# Patient Record
Sex: MALE | Race: WHITE | NOT HISPANIC OR LATINO | Employment: UNEMPLOYED | URBAN - METROPOLITAN AREA
[De-identification: names, ages, dates, MRNs, and addresses within clinical notes are randomized per-mention and may not be internally consistent; named-entity substitution may affect disease eponyms.]

---

## 2019-05-23 ENCOUNTER — NON-PROVIDER VISIT (OUTPATIENT)
Dept: OCCUPATIONAL MEDICINE | Facility: CLINIC | Age: 29
End: 2019-05-23

## 2019-05-23 DIAGNOSIS — Z02.1 PRE-EMPLOYMENT DRUG SCREENING: ICD-10-CM

## 2019-05-23 LAB
AMP AMPHETAMINE: NORMAL
COC COCAINE: NORMAL
INT CON NEG: NORMAL
INT CON POS: NORMAL
MET METHAMPHETAMINES: NORMAL
OPI OPIATES: NORMAL
PCP PHENCYCLIDINE: NORMAL
POC DRUG COMMENT 753798-OCCUPATIONAL HEALTH: NEGATIVE
THC: NORMAL

## 2019-05-23 PROCEDURE — 80305 DRUG TEST PRSMV DIR OPT OBS: CPT | Performed by: PREVENTIVE MEDICINE

## 2019-09-15 ENCOUNTER — OFFICE VISIT (OUTPATIENT)
Dept: URGENT CARE | Facility: MEDICAL CENTER | Age: 29
End: 2019-09-15
Payer: COMMERCIAL

## 2019-09-15 VITALS
OXYGEN SATURATION: 96 % | TEMPERATURE: 98.4 F | HEIGHT: 70 IN | HEART RATE: 70 BPM | SYSTOLIC BLOOD PRESSURE: 118 MMHG | RESPIRATION RATE: 16 BRPM | BODY MASS INDEX: 31.21 KG/M2 | WEIGHT: 218 LBS | DIASTOLIC BLOOD PRESSURE: 82 MMHG

## 2019-09-15 DIAGNOSIS — S86.911A KNEE STRAIN, RIGHT, INITIAL ENCOUNTER: ICD-10-CM

## 2019-09-15 PROCEDURE — 99203 OFFICE O/P NEW LOW 30 MIN: CPT | Performed by: PHYSICIAN ASSISTANT

## 2019-09-15 NOTE — PATIENT INSTRUCTIONS
Stretching and range of motion exercises  These exercises warm up your muscles and joints and improve the movement and flexibility of your leg. These exercises also help to relieve pain and stiffness.  Exercise A: Knee flexion, passive  1. Start this exercise in one of these positions:  ¨ Lying on the floor in front of an open doorway, with your left / right foot lightly touching the wall and your other leg extending through the doorway.  ¨ Lying on the floor with both feet on the wall.  ¨ Lying on a bed with both feet on the wall or headboard.  2. Without using any effort, allow gravity to let your foot slide down the wall slowly until you feel a gentle stretch in the front of your left / right knee, or until your knee reaches the angle that your health care provider tells you.  3. Hold this stretch for __________ seconds.  4. Return your leg to the starting position, using your healthy leg to do the work or to help if needed.  Repeat __________ times. Complete this exercise __________ times a day.  Exercise B: Knee extension, passive  1. Sit with your left / right leg or heel propped on another chair, a coffee table, or a footstool. Do not have anything under your knee to support it.  2. Allow your leg muscles to relax, letting gravity straighten out your knee. You should feel a stretch behind your left / right knee.  3. If told by your health care provider, deepen the stretch by placing a __________ lb weight on your thigh, just above your kneecap.  4. Hold this position for __________ seconds.  Repeat __________ times. Complete this exercise __________ times a day.  Strengthening exercises  These exercises build strength and endurance in your leg. Endurance is the ability to use your muscles for a long time, even after they get tired.  Exercise C: Quadriceps, isometric  1. Lie on your back with your left / right leg extended and your other knee bent. If told by your health care provider, you can put a rolled  towel or small pillow under your knee.  2. Gradually tense the muscles in the front of your left / right thigh. You should see your kneecap slide up toward your hip or see increased dimpling just above the knee. This motion will push the back of the knee down toward the floor.  3. For __________ seconds, hold the muscle as tight as you can without increasing your pain.  4. Relax the muscles slowly and completely.  Repeat __________ times. Complete this exercise __________ times a day.  Exercise D: Straight leg raises (hip flexors and quadriceps)  1. Lie on your back with your left / right leg extended and your other knee bent.  2. Tense the muscles in the front of your left / right thigh. You should see your kneecap slide up toward your hip or see increased dimpling just above the knee.  3. Keep these muscles tight while you raise your leg 4-6 inches (10-15 cm) off the floor.  4. Hold this position for __________ seconds.  5. Keep the muscles tense while you lower your leg.  6. Relax the muscles slowly and completely before you start the next repetition.  Repeat __________ times. Complete this exercise __________ times a day.  Exercise E: Straight leg raises (hip abductors)  1. Lie on your side, with your left / right leg in the top position.  ¨ Lie so your head, shoulder, knee, and hip line up with each other.  ¨ You may bend your lower knee to help you balance.  2. Lift your top leg 4-6 inches (10-15 cm) while keeping your toes pointed straight ahead.  3. Hold this position for __________ seconds.  4. Slowly lower your leg to the starting position. Allow your muscles to relax completely after each repetition.  Repeat __________ times. Complete this exercise __________ times a day.  This information is not intended to replace advice given to you by your health care provider. Make sure you discuss any questions you have with your health care provider.  Document Released: 12/18/2006 Document Revised: 08/24/2017  Document Reviewed: 11/29/2016  [employer] Interactive Patient Education © 2017 [employer]

## 2019-09-15 NOTE — PROGRESS NOTES
"  Subjective:   Timothy Montero is a 29 y.o. male who presents today with   Chief Complaint   Patient presents with   • Knee Pain     (R) strained it, previous injury reaggravated it, x 1 week        HPI  Patient presents for a new problem has been occurring over the past week.  He states that he has had right knee pain that began when he was helping his sister move in a box fell on the proximal portion of his right patella.  Patient has been wearing a knee brace ever since.  He states that he exhibits pain with twisting and when trying to lift.  He has a long history of knee issues and states that he has hyperflexible in his patella has been affected several times before.  He has been seen by an orthopedic specialist and had MRIs done in the past.  Patient is requesting me to fill out his paperwork to restrict his duties at work and also is requesting exercises to help strengthen his knee.  Patient has been taking Aleve which significantly helps with his symptoms.  PMH:  has a past medical history of Psychiatric disorder.  MEDS:   Current Outpatient Medications:   •  ondansetron (ZOFRAN) 4 MG TABS, Take 1 Tab by mouth every 8 hours as needed for Nausea/Vomiting., Disp: 10 Each, Rfl: 1  ALLERGIES: No Known Allergies  SURGHX: History reviewed. No pertinent surgical history.  SOCHX:  reports that he has quit smoking. His smoking use included cigarettes. He smoked 0.50 packs per day. He has never used smokeless tobacco. He reports that he drinks alcohol.  FH: Reviewed with patient, not pertinent to this visit.       Review of Systems   Musculoskeletal:        Right knee pain        Objective:   /82   Pulse 70   Temp 36.9 °C (98.4 °F)   Resp 16   Ht 1.778 m (5' 10\")   Wt 98.9 kg (218 lb)   SpO2 96%   BMI 31.28 kg/m²   Physical Exam   Constitutional: Vital signs are normal. He appears well-developed and well-nourished. No distress.   HENT:   Head: Normocephalic and atraumatic.   Right Ear: Hearing normal.   "   Left Ear: Hearing normal.   Eyes: Pupils are equal, round, and reactive to light.   Cardiovascular: Normal rate, regular rhythm and normal heart sounds.   Pulmonary/Chest: Effort normal.   Musculoskeletal:        Right knee: He exhibits normal range of motion, no swelling and no effusion. Tenderness found. Patellar tendon tenderness noted.        Legs:  Slight tenderness palpation of the patella.  Patient is able to squat and bend his leg with some generalized soreness but no pain.  Patient has normal strength in his lower extremities bilaterally against resistance with flexion and extension.  When patient was asked to plant and pivot with his right knee he experienced pain.    Neurological: He is alert. Coordination normal.   Skin: Skin is warm and dry.   Psychiatric: He has a normal mood and affect.   Nursing note and vitals reviewed.        Assessment/Plan:   Assessment    1. Knee strain, right, initial encounter  Discussed strengthening exercises with patient and that he should wear his knee brace at all times while at work.  Patient should follow these restrictions for 1 week and return to clinic if symptoms are still persistent.  No imaging necessary today.  Differential diagnosis, natural history, supportive care, and indications for immediate follow-up discussed.   Patient given instructions and understanding of medications and treatment.    If not improving in 3-5 days return to .    Patient agreeable to plan.    Please note that this dictation was created using voice recognition software. I have made every reasonable attempt to correct obvious errors, but I expect that there are errors of grammar and possibly content that I did not discover before finalizing the note.    Amarjit Pereyra PA-C

## 2019-09-15 NOTE — LETTER
September 15, 2019         Patient: Timothy Montero   YOB: 1990   Date of Visit: 9/15/2019           To Whom it May Concern:    Timothy Montero was seen in my clinic on 9/15/2019. He should avoid any lifting over 25 pounds.  Patient should avoid any activity requiring pivoting or twisting of his right knee for 1 week.    If you have any questions or concerns, please don't hesitate to call.        Sincerely,           Amarjit Pereyra P.A.-C.  Electronically Signed

## 2019-10-01 ENCOUNTER — OFFICE VISIT (OUTPATIENT)
Dept: URGENT CARE | Facility: MEDICAL CENTER | Age: 29
End: 2019-10-01
Payer: COMMERCIAL

## 2019-10-01 VITALS
OXYGEN SATURATION: 95 % | RESPIRATION RATE: 16 BRPM | HEART RATE: 69 BPM | DIASTOLIC BLOOD PRESSURE: 80 MMHG | SYSTOLIC BLOOD PRESSURE: 120 MMHG | HEIGHT: 70 IN | WEIGHT: 218 LBS | TEMPERATURE: 98.9 F | BODY MASS INDEX: 31.21 KG/M2

## 2019-10-01 DIAGNOSIS — G89.29 CHRONIC PAIN OF RIGHT KNEE: Primary | ICD-10-CM

## 2019-10-01 DIAGNOSIS — M25.561 CHRONIC PAIN OF RIGHT KNEE: Primary | ICD-10-CM

## 2019-10-01 PROCEDURE — 99214 OFFICE O/P EST MOD 30 MIN: CPT | Performed by: PHYSICIAN ASSISTANT

## 2019-10-02 NOTE — PATIENT INSTRUCTIONS
Knee Pain  Knee pain is a very common symptom and can have many causes. Knee pain often goes away when you follow your health care provider's instructions for relieving pain and discomfort at home. However, knee pain can develop into a condition that needs treatment. Some conditions may include:  · Arthritis caused by wear and tear (osteoarthritis).  · Arthritis caused by swelling and irritation (rheumatoid arthritis or gout).  · A cyst or growth in your knee.  · An infection in your knee joint.  · An injury that will not heal.  · Damage, swelling, or irritation of the tissues that support your knee (torn ligaments or tendinitis).  If your knee pain continues, additional tests may be ordered to diagnose your condition. Tests may include X-rays or other imaging studies of your knee. You may also need to have fluid removed from your knee. Treatment for ongoing knee pain depends on the cause, but treatment may include:  · Medicines to relieve pain or swelling.  · Steroid injections in your knee.  · Physical therapy.  · Surgery.  HOME CARE INSTRUCTIONS  · Take medicines only as directed by your health care provider.  · Rest your knee and keep it raised (elevated) while you are resting.  · Do not do things that cause or worsen pain.  · Avoid high-impact activities or exercises, such as running, jumping rope, or doing jumping jacks.  · Apply ice to the knee area:  ¨ Put ice in a plastic bag.  ¨ Place a towel between your skin and the bag.  ¨ Leave the ice on for 20 minutes, 2-3 times a day.  · Ask your health care provider if you should wear an elastic knee support.  · Keep a pillow under your knee when you sleep.  · Lose weight if you are overweight. Extra weight can put pressure on your knee.  · Do not use any tobacco products, including cigarettes, chewing tobacco, or electronic cigarettes. If you need help quitting, ask your health care provider. Smoking may slow the healing of any bone and joint problems that you may  have.  SEEK MEDICAL CARE IF:  · Your knee pain continues, changes, or gets worse.  · You have a fever along with knee pain.  · Your knee jenni or locks up.  · Your knee becomes more swollen.  SEEK IMMEDIATE MEDICAL CARE IF:   · Your knee joint feels hot to the touch.  · You have chest pain or trouble breathing.  This information is not intended to replace advice given to you by your health care provider. Make sure you discuss any questions you have with your health care provider.  Document Released: 10/14/2008 Document Revised: 01/08/2016 Document Reviewed: 08/03/2015  [employer] Interactive Patient Education © 2017 [employer]

## 2019-10-02 NOTE — PROGRESS NOTES
Subjective:      Pt is a 29 y.o. male who presents with Knee Injury ((R) went back to work but knee pain came back right away, )            HPI  This is a recurrent issue. Patient presents for a recurrent problem has been occurring over the past 3 weeks.  He states that he has had right knee pain that began when he was helping his sister move in a box fell on the proximal portion of his right patella.  Patient has been wearing a knee brace ever since.  He states that he exhibits pain with twisting and when trying to lift.  He has a long history of knee issues and states that he has hyperflexible in his patella has been affected several times before.  He has been seen by an orthopedic specialist and had MRIs done in the past.  Patient is requesting me to fill out his paperwork to restrict his duties at work and also is requesting exercises to help strengthen his knee.  Patient has been taking Aleve which significantly helps with his symptoms. Pt has not taken any Rx medications for this condition. Pt states the pain is a 7-8/10, aching in nature and worse at night. Pt asking for narcotic pain medicine and a work note for 2 weeks off work. Pt denies CP, SOB, NVD, paresthesias, headaches, dizziness, change in vision, hives, or other joint pain. The pt's medication list, problem list, and allergies have been evaluated and reviewed during today's visit.    PMH:  Past Medical History:   Diagnosis Date   • Psychiatric disorder     bipolar       PSH:  Negative per pt.      Fam Hx:  the patient's family history is not pertinent to their current complaint      Soc HX:  Social History     Socioeconomic History   • Marital status: Single     Spouse name: Not on file   • Number of children: Not on file   • Years of education: Not on file   • Highest education level: Not on file   Occupational History   • Not on file   Social Needs   • Financial resource strain: Not on file   • Food insecurity:     Worry: Not on file      Inability: Not on file   • Transportation needs:     Medical: Not on file     Non-medical: Not on file   Tobacco Use   • Smoking status: Former Smoker     Packs/day: 0.50     Types: Cigarettes   • Smokeless tobacco: Never Used   Substance and Sexual Activity   • Alcohol use: Yes   • Drug use: Not on file   • Sexual activity: Not on file   Lifestyle   • Physical activity:     Days per week: Not on file     Minutes per session: Not on file   • Stress: Not on file   Relationships   • Social connections:     Talks on phone: Not on file     Gets together: Not on file     Attends Christianity service: Not on file     Active member of club or organization: Not on file     Attends meetings of clubs or organizations: Not on file     Relationship status: Not on file   • Intimate partner violence:     Fear of current or ex partner: Not on file     Emotionally abused: Not on file     Physically abused: Not on file     Forced sexual activity: Not on file   Other Topics Concern   • Not on file   Social History Narrative   • Not on file         Medications:    Current Outpatient Medications:   •  ondansetron (ZOFRAN) 4 MG TABS, Take 1 Tab by mouth every 8 hours as needed for Nausea/Vomiting., Disp: 10 Each, Rfl: 1      Allergies:  Patient has no known allergies.    ROS  Constitutional: Negative for fever, chills and malaise/fatigue.   HENT: Negative for congestion and sore throat.    Eyes: Negative for blurred vision, double vision and photophobia.   Respiratory: Negative for cough and shortness of breath.  Cardiovascular: Negative for chest pain and palpitations.   Gastrointestinal: Negative for heartburn, nausea, vomiting, abdominal pain, diarrhea and constipation.   Genitourinary: Negative for dysuria and flank pain.   Musculoskeletal: POS for right knee joint pain and myalgias.   Skin: Negative for itching and rash.   Neurological: Negative for dizziness, tingling and headaches.   Endo/Heme/Allergies: Does not bruise/bleed  "easily.   Psychiatric/Behavioral: Negative for depression. The patient is not nervous/anxious.           Objective:     /80   Pulse 69   Temp 37.2 °C (98.9 °F)   Resp 16   Ht 1.778 m (5' 10\")   Wt 98.9 kg (218 lb)   SpO2 95%   BMI 31.28 kg/m²      Physical Exam   Musculoskeletal:        Right knee: He exhibits decreased range of motion. He exhibits no swelling, no effusion, no ecchymosis, no deformity, no laceration, no erythema, normal alignment, no LCL laxity, normal patellar mobility, no bony tenderness, normal meniscus and no MCL laxity. Tenderness found. Medial joint line, lateral joint line and patellar tendon tenderness noted. No MCL and no LCL tenderness noted.        Legs:        Constitutional: PT is oriented to person, place, and time. PT appears well-developed and well-nourished. No distress.   HENT:   Head: Normocephalic and atraumatic.   Mouth/Throat: Oropharynx is clear and moist. No oropharyngeal exudate.   Eyes: Conjunctivae normal and EOM are normal. Pupils are equal, round, and reactive to light.   Neck: Normal range of motion. Neck supple. No thyromegaly present.   Cardiovascular: Normal rate, regular rhythm, normal heart sounds and intact distal pulses.  Exam reveals no gallop and no friction rub.    No murmur heard.  Pulmonary/Chest: Effort normal and breath sounds normal. No respiratory distress. PT has no wheezes. PT has no rales. Pt exhibits no tenderness.   Abdominal: Soft. Bowel sounds are normal. PT exhibits no distension and no mass. There is no tenderness. There is no rebound and no guarding.   Neurological: PT is alert and oriented to person, place, and time. PT has normal reflexes. No cranial nerve deficit.   Skin: Skin is warm and dry. No rash noted. PT is not diaphoretic. No erythema.       Psychiatric: PT has a normal mood and affect. PT behavior is normal. Judgment and thought content normal.          Assessment/Plan:     1. Chronic pain of right knee    - REFERRAL TO " SPORTS MEDICINE    Filled out work paperwork and noted this needed to be done in the future with PCP or specialist  I do not feel narcotic pain medication is appropriate currently  Could not create a work note for 14 days off and pt accepting of this.  RICE therapy discussed  Gentle ROM exercises discussed  WBAT RLE  Ice/heat therapy discussed  OTC ibuprofen for pain control  Rest, fluids encouraged.  AVS with medical info given.  Pt was in full understanding and agreement with the plan.  Follow-up as needed if symptoms worsen or fail to improve.

## 2019-10-07 ENCOUNTER — OFFICE VISIT (OUTPATIENT)
Dept: MEDICAL GROUP | Facility: CLINIC | Age: 29
End: 2019-10-07
Payer: COMMERCIAL

## 2019-10-07 ENCOUNTER — APPOINTMENT (OUTPATIENT)
Dept: RADIOLOGY | Facility: IMAGING CENTER | Age: 29
End: 2019-10-07
Attending: FAMILY MEDICINE
Payer: COMMERCIAL

## 2019-10-07 VITALS
SYSTOLIC BLOOD PRESSURE: 122 MMHG | BODY MASS INDEX: 31.21 KG/M2 | DIASTOLIC BLOOD PRESSURE: 80 MMHG | TEMPERATURE: 98.7 F | HEART RATE: 85 BPM | OXYGEN SATURATION: 97 % | HEIGHT: 70 IN | WEIGHT: 218 LBS | RESPIRATION RATE: 18 BRPM

## 2019-10-07 DIAGNOSIS — M25.561 PATELLOFEMORAL JOINT PAIN, RIGHT: ICD-10-CM

## 2019-10-07 PROCEDURE — 73564 X-RAY EXAM KNEE 4 OR MORE: CPT | Mod: TC,RT | Performed by: FAMILY MEDICINE

## 2019-10-07 PROCEDURE — 99203 OFFICE O/P NEW LOW 30 MIN: CPT | Performed by: FAMILY MEDICINE

## 2019-10-07 NOTE — PROGRESS NOTES
"CHIEF COMPLAINT:  Silvestre Montero III male presenting at the request of Rafa Castillo PA-C for evaluation of knee pain.     Silvestre Montero III is complaining of right knee pain  9/9/19, helped sister move and box pressure down on his knee cap  Pain is at the anterior, posterior knee  Quality is burning  Pain is non-radiating, but having posterior knee pain as well  Improved with resting  Aggravated by walking, driving  previous knee injury, patellar dislocation x 2 (last episode at age 24)   Prior Treatments: seen at   Prior studies: NO Prior imaging has been done   Medications tried for pain include: ibuprofen (OTC) which is not really helping  Mechanical Symptom history: No Locking, Popping and clicking which can be painful     Warehouse job,  (standing), 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 No pertinent past medical history.    PMH:  has a past medical history of Psychiatric disorder.  MEDS:   Current Outpatient Medications:   •  ondansetron (ZOFRAN) 4 MG TABS, Take 1 Tab by mouth every 8 hours as needed for Nausea/Vomiting. (Patient not taking: Reported on 10/7/2019), Disp: 10 Each, Rfl: 1  ALLERGIES: No Known Allergies  SURGHX: No past surgical history on file.  SOCHX:  reports that he has quit smoking. His smoking use included cigarettes. He smoked 0.50 packs per day. He has never used smokeless tobacco. He reports that he drinks alcohol.  FH: Family history was reviewed, no pertinent findings to report     PHYSICAL EXAM:  /80 (BP Location: Left arm, Patient Position: Sitting, BP Cuff Size: Large adult)   Pulse 85   Temp 37.1 °C (98.7 °F) (Temporal)   Resp 18   Ht 1.778 m (5' 10\")   Wt 98.9 kg (218 lb)   SpO2 97%   BMI 31.28 kg/m²      slightly overweight in no apparent distress, alert and " oriented x 3.  Gait: antalgic     RIGHT Knee:  Slight Varus and No Swelling  Range of Motion Slightly limited with Flexion   Trace effusion  Patellar Apprehension, Inferior pole tenderness and Superior pole tenderness  Medial Joint Line Non-tender and NEGATIVE Jean  Lateral Joint Line Non-tender and NEGATIVE Jean  Trace Laxity with Varus stress  Trace Laxity with Valgus stress  Lachman's testing is Trace  The leg is otherwise neurovascularly intact    LEFT Knee:  Slight Varus and No Swelling   Range of Motion Intact  Trace effusion  Patellar No tenderness and no apprehension  Medial Joint Line Non-tender and NEGATIVE Jean  Lateral Joint Line Non-tender and NEGATIVE Jean  Trace Laxity with Varus stress  Trace Laxity with Valgus stress  Lachman's testing is Trace  The leg is otherwise neurovascularly intact    Additional Findings: Hypermobility of BOTH patellas    1. 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  To see if he started formal physical therapy at that point        10/7/2019 4:38 PM    HISTORY/REASON FOR EXAM:  Atraumatic right knee pain.      TECHNIQUE/EXAM DESCRIPTION AND NUMBER OF VIEWS:  4 views of the RIGHT knee including bilateral sunrise view.    COMPARISON: None    FINDINGS:    The alignment of the knee is within normal limits.  There is no definite  joint  effusion.  There is no evidence of displaced fracture or dislocation.  There is no focal swelling.  Both patellae are normally located. There is curvilinear ossification adjacent to the medial pole of the right patella which may represent sequela of old injury or segmentation variant of the patella.        Impression       1.  No right knee fracture or dislocation.    2.  Curvilinear ossification adjacent to medial pole the right patella which may represent sequela of old injury or segmentation variant.        Thank you Rafa Castillo PA-C for allowing me to participate in caring for the patient.

## 2019-10-07 NOTE — LETTER
October 7, 2019         Patient: Silvestre Montero III   YOB: 1990   Date of Visit: 10/7/2019           To Whom it May Concern:    Silvestre Montero was seen in my clinic on 10/7/2019.  Due to injury he has been advised to avoid squatting, lifting greater than 25 pounds and limiting the use of stairs.  He should also use his knee brace at all times when he is active until reevaluation in 3 weeks.    If you have any questions or concerns, please don't hesitate to call.        Sincerely,           Dimas Vale M.D.  Electronically Signed

## 2019-10-17 NOTE — OP THERAPY EVALUATION
"  Outpatient Physical Therapy  INITIAL EVALUATION    [facility] [address].  [address]  [place] [address]  Phone:  [phone]  Fax:  [phone]    Date of Evaluation: 10/18/2019    Patient: TETO Montero III  YOB: 1990  MRN: [id number]     Referring Provider: No referring provider defined for this encounter.   Referring Diagnosis No admission diagnoses are documented for this encounter.     Time Calculation  Start time: 830  Stop time: 945 Time Calculation (min): 75 minutes       Physical Therapy Occurrence Codes    Date of onset of impairment:  19   Date physical therapy care plan established or reviewed:  10/18/19   Date physical therapy treatment started:  10/18/19          Chief Complaint: Knee Problem    Visit Diagnoses     ICD-10-CM   1. Right knee pain, unspecified chronicity M25.561         Subjective   History of Present Illness:     Date of onset:  2019    History of chief complaint:  Patient is a 29 year old male with a PMH including: Bipolar disorder; no other hx per record. Pt reports he was 9 years old when first incidence of knee buckling occurred. Hx of 3x patellar dislocations (2014 most recent). Reports no hx of surgery, only therapy to address. Pt reports hx of significant hyper flexibility and \"above average strength.\"     Pt presents to therapy with complaints of R knee pain with intermittent numbness/tingling in R shin after helping sister move. Pt reports carrying 40# box which fell onto R knee, and pushing down superior patella with extended knee. Hx of popping, cracking, and locking in R knee.    Pt consents to evaluation and treatment today.       Prior level of function:   at a [employer]    Pain:     Current pain ratin    At best pain ratin (Very seldom)    At worst pain ratin    Location:  Lateral knee, posterior knee, inferior and superior patellar tendons    Quality:  Aching, tightness and " "discomfort (\"Numbing pain\")    Pain timing:  Intermittent    Aggravating factors:  Driving 3-3.5 hours  Prolonged sitting <60 min (Jain is difficult; sitting through whole sermon) causing stiffness  Descending steps >ascending steps with brace  Prolonged stand >20 min (difficulty prepping meals)            Relieving factors:  Rest  Knee Brace  Ice  Hot baths (helps loosen knee up)  Massaging entire RLE    Progression:  Stable    Pain comments:        Activity Tolerance:     Current activity tolerance / Recreational activities:  \"I'm at home most of the time.\" \"I go to the [employer] or game shop occasionally.\" \"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 Pt reports he manually lifts up to 80 pounds and 50 pounds regularly.  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 health appt: 10/28/19    Social Support:     Lives in:  Apartment (Bottom floor with 2 steps to enter with handrail)    Diagnostic Tests:     X-ray: abnormal      Treatments:     Treatments to date:  Pervious tx:   Pt given hep from recent appointment with PAC; \"I haven't really done the exercises on the sheet because of pain.\"  Tracking brace    PT 2006, PT 2014; \"PT was helpful; I was able to get back to working in a warehouse, it was very helpful.\" I'm still doing the hip abduction and long arc quads.    Imaging: R knee x ray 10/7/19:   FINDINGS:    The alignment of the knee is within normal limits.  There is no definite  joint effusion.  There is no evidence of displaced fracture or dislocation.  There is no focal swelling.  1.  No right knee fracture or dislocation.    2. Curvilinear ossification adjacent to medial pole the right patella which may represent sequela of old injury " "or segmentation variant.      Patient Goals:     Patient goals for therapy:  More stability and control  RTW      Past Medical History:   Diagnosis Date   • Psychiatric disorder     bipolar     History reviewed. No pertinent surgical history.    Precautions:       Objective   Observation and functional movement:  Seated posture: fair    Standing posture: fair  Genu valgum bilateral knees with noted B hyperextension       Range of motion and strength:    AROM: Excessive BLE mobility noted    Knee extension:  R: 10 deg hyperextension  L: 10 deg hyperextension    HS length: Excessive    Ankle Inv/Ev:  R: Pain and guarding R knee \"it feels weird in my knee to turn my ankle\"     Strength:  Hip flexion:  R: 4+/5  L: 5/5  Hip abduction: tested in hooklying  R: 4-  L: 4-  Knee extension: tested in sitting  R: 4-/5  L: 5/5  Knee flexion: testing in prone  R: 4-/5 all HS (pain; \"I felt that in the entire thing\" referring to R knee)  L:4-/5 all HS  Ankle PF: tested in sitting  R: 4+/5  L:5/5  Ankle DF: tested in sitting  R: 4+/5 (\"it feels weird in front of the knee\")  L: 5/5     Supine Bridge: Fatigue after 5 reps; full AROM; muscle juddering noted in R thigh        Sensation and reflexes:     Sensation: B LE dermatomes intact. Reports tingling/paresthesia in R lateral shin but sensation in area intact     Reflexes:   R patellar: 0  R achilles: 2+  L patellar: 0  L achilles: 2+      Palpation and joint mobility:     Patellar mobility:   L: WNL sup and inferior; excessive medial and lateral glide  R: WNL sup and inferior; excessive medial; guarding and apprehension with lateral glide    TTP  Joint line tenderness medial and lateral R knee  Tenderness inferior and superior pole R patellar tendon  Tenderness R patellar retinacular region          Special tests:      Valgus stress test: -  Varus stress test: -  Tammy's: -; \"that feels weird in the front of the knee\"        Additional objective details:      Excessive " "hypermobility noted in all joints        Therapeutic Exercises (CPT 03630):     2. Supine bridges, 1x10, fatigue, muscle juddering R thigh; hep    3. Supine SLR, 1x5, hep; \"I can feel it in the front of the R knee\"    4. Supine ankle PF/DF, 1x10 ea, \"this doesn't hurt like when I was sitting;\" hep    5. Sidelying hip abduction, 1x2, trunk rotation, increase in R knee s/s    6. Sidelying clamshells, 1x5ea, fatigue; hep    8. UPOC: 11/29/19      Therapeutic Exercise Summary: Pt education: Pt educated regarding appropriate range for knee extension in open chain to avoid close packed positioning of patella. Provided with written hep handout. Educated to continue wearing knee brace at this time.    Therapeutic Treatments and Modalities:     1. E Stim Unattended (CPT 44936), Russian stim to R VMO 5'5 with knee extension 90 deg>45 deg in sitting x 10 min, followed by heat to R knee x 5 min in sitting, Emphasis on knee extension strength in tolerable range avoiding closed packed positions of patella in open chain positioning    Therapeutic Treatment and Modalities Summary: Pt provided with handout for TENS unit for home use    Time-based treatments/modalities:  Therapeutic exercise minutes (CPT 76793): 15 minutes       Assessment, Response and Plan:   Impairments: activity intolerance, difficulty performing job, impaired physical strength, lacks appropriate home exercise program, limited ADL's and weight-bearing intolerance    Other Impairments:  Hypermobility with limited stability  Assessment details:  Patient is a 29 year old male with a PMH including: Bipolar disorder; no other hx per record. Pt reports he was 9 years old when first incidence of knee buckling occurred. Hx of 3x patellar dislocations (2014 most recent). Reports no hx of surgery, only therapy to address. Pt reports hx of significant hyper flexibility and \"above average strength.\" Pt presents to therapy with complaints of R knee pain with intermittent " numbness/tingling in R shin after helping sister move. Pt reports carrying 40# box which fell onto R knee, and pushing down superior patella with extended knee. Hx of popping, cracking, and locking in R knee. Pt would benefit from skilled physical therapy in order to address above impairments including B genu valgum with hyperextension, excessive mobility B patellae, hypermobility with limited stability B knees, posterior chain weakness in order to improve QOL and return to reported ADL's.     Barriers to therapy:  None  Prognosis details:  Good-fair  Goals:   Short Term Goals:   1. Pt will be independent with written HEP.    Short term goal time span:  2-4 weeks      Long Term Goals:    1. Pt will be independent with written HEP.  2. Pt will have a WOMAC score improvement of 30% (eval:46.88)  3. Pt will be able to stand without modification in order to prepare meals and perform other ADL's.  4. Pt will be able to lift 50# with modification without increase in symptoms 75% of the time or greater in order to RTW.  5. Pt will be able to descend 4 in step without handrail without increase in symptoms in order to leave house without difficulty.  6. Pt will be able to sit for 60 min with modification without increase in symptoms 75% of the time or greater in order to sit for entire sermon at [employer].  Long term goal time span:  6-8 weeks    Plan:   Therapy options:  Physical therapy treatment to continue  Planned therapy interventions:  Manual Therapy (CPT 64817), E Stim Unattended (CPT 27253), Neuromuscular Re-education (CPT 10890), Therapeutic Activities (CPT 46281) and Therapeutic Exercise (CPT 49722)  Frequency:  2x week  Duration in weeks:  6  Discussed with:  Patient  Plan details:  UPOC: 11/29/19              Functional Assessment Used  WOMAC Grand Total: 46.88     Cliff Powers, PT, DPT, PWR!Moves    Referring provider co-signature:  I have reviewed this plan of care and my co-signature certifies the need for  services.  Certification Dates:   From 10/18/19     To 11/29/19      Physician Signature: ________________________________ Date: ______________

## 2019-10-18 ENCOUNTER — PHYSICAL THERAPY (OUTPATIENT)
Dept: PHYSICAL THERAPY | Facility: REHABILITATION | Age: 29
End: 2019-10-18
Attending: FAMILY MEDICINE
Payer: COMMERCIAL

## 2019-10-18 DIAGNOSIS — M25.561 RIGHT KNEE PAIN, UNSPECIFIED CHRONICITY: ICD-10-CM

## 2019-10-18 PROCEDURE — 97110 THERAPEUTIC EXERCISES: CPT

## 2019-10-18 PROCEDURE — 97014 ELECTRIC STIMULATION THERAPY: CPT

## 2019-10-18 PROCEDURE — 97161 PT EVAL LOW COMPLEX 20 MIN: CPT

## 2019-10-18 ASSESSMENT — ENCOUNTER SYMPTOMS
PAIN SCALE AT HIGHEST: 7
QUALITY: DISCOMFORT
PAIN SCALE: 2
PAIN TIMING: INTERMITTENT
QUALITY: ACHING
PAIN SCALE AT LOWEST: 0
QUALITY: TIGHTNESS

## 2019-10-21 ENCOUNTER — APPOINTMENT (OUTPATIENT)
Dept: PHYSICAL THERAPY | Facility: REHABILITATION | Age: 29
End: 2019-10-21
Attending: FAMILY MEDICINE
Payer: COMMERCIAL

## 2019-10-21 NOTE — OP THERAPY DAILY TREATMENT
"  Outpatient Physical Therapy  DAILY TREATMENT     [facility] Physical [address].  [address]  [place] [address]  Phone:  [phone]  Fax:  [phone]    Date: 10/21/2019    Patient: TETO Montero III  YOB: 1990  MRN: [id number]     Time Calculation               Chief Complaint: No chief complaint on file.    Visit #: 2    SUBJECTIVE:      OBJECTIVE:  Current objective measures:           Therapeutic Exercises (CPT 36986):     1. Reviewed hep     2. Supine bridges, 1x10, fatigue, muscle juddering R thigh; hep    3. Supine SLR, 1x5, hep; \"I can feel it in the front of the R knee\"    4. Supine ankle PF/DF, 1x10 ea, \"this doesn't hurt like when I was sitting;\" hep    6. Sidelying clamshells, 1x5ea, fatigue; hep    7. HS rolls with swiss ball    8. Bridge on swiss ball    9. Ankle DF/PF and Inv with TB    14. UPOC: 11/29/19      Therapeutic Exercise Summary: Pt education: Pt educated regarding appropriate range for knee extension in open chain to avoid close packed positioning of patella. Provided with written hep handout. Educated to continue wearing knee brace at this time.    Therapeutic Treatments and Modalities:     1. E Stim Unattended (CPT 59329), Russian stim to R VMO 5'5 with knee extension 90 deg>45 deg in sitting x 10 min, followed by heat to R knee x 5 min in sitting, Emphasis on knee extension strength in tolerable range avoiding closed packed positions of patella in open chain positioning    Therapeutic Treatment and Modalities Summary: Pt provided with handout for TENS unit for home use    Time-based treatments/modalities:      **ankle PF/DF and Inv      ASSESSMENT:   Response to treatment:     PLAN/RECOMMENDATIONS:   Plan for treatment: therapy treatment to continue next visit.  Planned interventions for next visit: continue with current treatment.      Cliff Powers, PT, DPT, PWR!Moves         "

## 2019-11-26 ENCOUNTER — TELEPHONE (OUTPATIENT)
Dept: PHYSICAL THERAPY | Facility: REHABILITATION | Age: 29
End: 2019-11-26

## 2019-11-26 NOTE — OP THERAPY DISCHARGE SUMMARY
Outpatient Physical Therapy  DISCHARGE SUMMARY NOTE      [facility] [address].  [address]  [place] [address]  Phone:  [phone]  Fax:  [phone]    Date of Visit: 11/26/2019    Patient: TETO Seguraehn III  YOB: 1990  MRN: [id number]     Referring Provider: No referring provider defined for this encounter.   Referring Diagnosis No admission diagnoses are documented for this encounter.     Physical Therapy Occurrence Codes    Date of onset of impairment:  9/9/19   Date physical therapy care plan established or reviewed:  10/18/19   Date physical therapy treatment started:  10/18/19          Functional Assessment Used        Your patient is being discharged from Physical Therapy with the following comments:   · Patient has failed to schedule follow ups        Recommendations:  Pt has not been seen since initial evaluation >30 days. Pt has failed to schedule additional follow ups. Per policy, pt will need to be seen by PCP or acquire another referral prior to initiating skilled physical therapy. Pt is being discharged at this time.        Cliff Powers, PT    Date: 11/26/2019

## 2020-02-07 ENCOUNTER — TELEPHONE (OUTPATIENT)
Dept: URGENT CARE | Facility: MEDICAL CENTER | Age: 30
End: 2020-02-07

## 2020-02-08 NOTE — TELEPHONE ENCOUNTER
Pt was seen october and got a referral to see a specialist but due to insurance change he needs a need referral to be seen.

## 2020-02-10 ENCOUNTER — TELEPHONE (OUTPATIENT)
Dept: URGENT CARE | Facility: PHYSICIAN GROUP | Age: 30
End: 2020-02-10

## 2020-02-10 DIAGNOSIS — M25.561 CHRONIC PAIN OF RIGHT KNEE: Primary | ICD-10-CM

## 2020-02-10 DIAGNOSIS — G89.29 CHRONIC PAIN OF RIGHT KNEE: Primary | ICD-10-CM

## 2020-02-10 NOTE — TELEPHONE ENCOUNTER
Pt notes he had original referral but insurance changed and needs a new referral. Placed it today.  Rafa Castillo PA-C

## 2020-03-17 ENCOUNTER — TELEPHONE (OUTPATIENT)
Dept: HEALTH INFORMATION MANAGEMENT | Facility: OTHER | Age: 30
End: 2020-03-17

## 2020-03-17 ENCOUNTER — OFFICE VISIT (OUTPATIENT)
Dept: URGENT CARE | Facility: CLINIC | Age: 30
End: 2020-03-17
Payer: OTHER MISCELLANEOUS

## 2020-03-17 VITALS
OXYGEN SATURATION: 97 % | SYSTOLIC BLOOD PRESSURE: 120 MMHG | TEMPERATURE: 97.2 F | WEIGHT: 218 LBS | HEIGHT: 70 IN | BODY MASS INDEX: 31.21 KG/M2 | HEART RATE: 73 BPM | RESPIRATION RATE: 15 BRPM | DIASTOLIC BLOOD PRESSURE: 76 MMHG

## 2020-03-17 DIAGNOSIS — J06.9 VIRAL URI WITH COUGH: ICD-10-CM

## 2020-03-17 LAB
FLUAV+FLUBV AG SPEC QL IA: NEGATIVE
INT CON NEG: NORMAL
INT CON NEG: NORMAL
INT CON POS: NORMAL
INT CON POS: NORMAL
S PYO AG THROAT QL: NEGATIVE

## 2020-03-17 PROCEDURE — 99214 OFFICE O/P EST MOD 30 MIN: CPT | Performed by: FAMILY MEDICINE

## 2020-03-17 PROCEDURE — 87804 INFLUENZA ASSAY W/OPTIC: CPT | Performed by: FAMILY MEDICINE

## 2020-03-17 PROCEDURE — 87880 STREP A ASSAY W/OPTIC: CPT | Performed by: FAMILY MEDICINE

## 2020-03-17 RX ORDER — BENZONATATE 200 MG/1
200 CAPSULE ORAL 3 TIMES DAILY PRN
Qty: 45 CAP | Refills: 0 | Status: SHIPPED | OUTPATIENT
Start: 2020-03-17 | End: 2022-05-13

## 2020-03-17 RX ORDER — FLUTICASONE PROPIONATE 50 MCG
1 SPRAY, SUSPENSION (ML) NASAL 2 TIMES DAILY
Qty: 1 BOTTLE | Refills: 0 | Status: SHIPPED | OUTPATIENT
Start: 2020-03-17 | End: 2022-05-13

## 2020-03-17 NOTE — TELEPHONE ENCOUNTER
1. Caller Name: Silvestre Montero III                          Call Back Number: [phone]    [facility] PCP or Specialty Provider: No  Uninsured, no PCP         2.  Does patient have any active symptoms of respiratory illness (fever OR cough OR shortness of breath)? Yes, the patient reports the following respiratory symptoms: cough and dry cough, nasal congestion, sinus pressure, lethargic, alternating between hot/cold..    3.  Does patient have any comoribidities? None     4.  In the last 30 days, has the patient traveled outside of the country OR in a high risk area within the  OR have any known contact with someone who has or is suspected to have COVID-19?  No.    5. Disposition: Advised to perform self care, monitor for worsening symptoms and to call back in 3 days if no improvement . Pt states he needs a note from a doctor to stay home because it is required from his work. Advised to go to  located at [address] [place] [address], phone number and instructions given.    Note routed to PCP: KASSI only.

## 2020-03-17 NOTE — LETTER
March 17, 2020         Patient: Silvestre Montero III   YOB: 1990   Date of Visit: 3/17/2020           To Whom it May Concern:    Silvestre Montero was seen in my clinic on 3/17/2020. He may return to work on Monday.    If you have any questions or concerns, please don't hesitate to call.        Sincerely,           Luis Dumont M.D.  Electronically Signed

## 2020-03-18 NOTE — PROGRESS NOTES
"Subjective:     CC:  presents with Pharyngitis            Pharyngitis   This is a new problem. The current episode started in the past 4 days. The problem has been unchanged. There has been subj fever. The pain is mild. Associated symptoms includes: nasal congestion, prod cough.  Pertinent negatives include no abdominal pain,   diarrhea, headaches, shortness of breath or vomiting. no exposure to strep or mono.   has tried acetaminophen for the symptoms. The treatment provided mild relief.     Social History     Tobacco Use   • Smoking status: Former Smoker     Packs/day: 0.50     Types: Cigarettes   • Smokeless tobacco: Never Used   Substance Use Topics   • Alcohol use: Yes   • Drug use: Not on file       Past Medical History:   Diagnosis Date   • Psychiatric disorder     bipolar       Review of Systems   Constitutional: Positive for malaise/fatigue. Negative for fever and weight loss.   HENT: Positive for sore throat, ear congestion.    Respiratory: Negative for cough, sputum production and shortness of breath.    Cardiovascular: Negative for chest pain.   Gastrointestinal: Negative for nausea, vomiting, abdominal pain and diarrhea.   Genitourinary: Negative.    Neurological: Negative for dizziness and headaches.   All other systems reviewed and are negative.         Objective:   /76   Pulse 73   Temp 36.2 °C (97.2 °F) (Temporal)   Resp 15   Ht 1.778 m (5' 10\")   Wt 98.9 kg (218 lb)   SpO2 97%         Physical Exam   Constitutional:   oriented to person, place, and time.  appears well-developed and well-nourished. No distress.   HENT:   Head: Normocephalic and atraumatic.   Right Ear: External ear normal.   Left Ear: External ear normal.   TMs both nl  Nose: Mucosal edema present. Right sinus exhibits no maxillary sinus tenderness and no frontal sinus tenderness. Left sinus exhibits no maxillary sinus tenderness and no frontal sinus tenderness.   Mouth/Throat: no posterior oropharyngeal exudate.   There " is posterior oropharyngeal erythema present. No posterior oropharyngeal edema.   Tonsils 1+ bilaterally     Eyes: Conjunctivae and EOM are normal. Pupils are equal, round, and reactive to light. Right eye exhibits no discharge. Left eye exhibits no discharge. No scleral icterus.   Neck: Normal range of motion. Neck supple. No JVD present. No tracheal deviation present. No thyromegaly present.   Cardiovascular: Normal rate, regular rhythm, normal heart sounds and intact distal pulses.  Exam reveals no friction rub.    No murmur heard.  Pulmonary/Chest: Effort normal and breath sounds normal. No respiratory distress.   no wheezes.   no rales.    Musculoskeletal:  exhibits no edema.   Lymphadenopathy:    no cervical LAD  Neurological:   alert and oriented to person, place, and time.   Skin: Skin is warm and dry. No erythema.   Psychiatric:   normal mood and affect.   Nursing note and vitals reviewed.             Assessment/Plan:     1. Viral URI with cough  Rapid strep, influenza negative.      - benzonatate (TESSALON) 200 MG capsule; Take 1 Cap by mouth 3 times a day as needed for Cough.  Dispense: 45 Cap; Refill: 0  - fluticasone (FLONASE) 50 MCG/ACT nasal spray; Spray 1 Spray in nose 2 times a day.  Dispense: 1 Bottle; Refill: 0    Follow up in one week if no improvement, sooner if symptoms worsen.

## 2020-05-05 ENCOUNTER — APPOINTMENT (OUTPATIENT)
Dept: RADIOLOGY | Facility: IMAGING CENTER | Age: 30
End: 2020-05-05
Attending: FAMILY MEDICINE
Payer: OTHER MISCELLANEOUS

## 2020-05-05 ENCOUNTER — OFFICE VISIT (OUTPATIENT)
Dept: URGENT CARE | Facility: CLINIC | Age: 30
End: 2020-05-05
Payer: OTHER MISCELLANEOUS

## 2020-05-05 ENCOUNTER — HOSPITAL ENCOUNTER (OUTPATIENT)
Facility: MEDICAL CENTER | Age: 30
End: 2020-05-05
Attending: FAMILY MEDICINE
Payer: OTHER MISCELLANEOUS

## 2020-05-05 VITALS
WEIGHT: 227 LBS | RESPIRATION RATE: 20 BRPM | BODY MASS INDEX: 33.62 KG/M2 | TEMPERATURE: 98.9 F | DIASTOLIC BLOOD PRESSURE: 80 MMHG | OXYGEN SATURATION: 95 % | SYSTOLIC BLOOD PRESSURE: 130 MMHG | HEART RATE: 86 BPM | HEIGHT: 69 IN

## 2020-05-05 DIAGNOSIS — R06.02 SOB (SHORTNESS OF BREATH): ICD-10-CM

## 2020-05-05 DIAGNOSIS — R53.83 FATIGUE, UNSPECIFIED TYPE: ICD-10-CM

## 2020-05-05 LAB
ALBUMIN SERPL BCP-MCNC: 4.8 G/DL (ref 3.2–4.9)
ALBUMIN/GLOB SERPL: 1.5 G/DL
ALP SERPL-CCNC: 97 U/L (ref 30–99)
ALT SERPL-CCNC: 26 U/L (ref 2–50)
ANION GAP SERPL CALC-SCNC: 14 MMOL/L (ref 7–16)
AST SERPL-CCNC: 25 U/L (ref 12–45)
BASOPHILS # BLD AUTO: 0.8 % (ref 0–1.8)
BASOPHILS # BLD: 0.1 K/UL (ref 0–0.12)
BILIRUB SERPL-MCNC: 0.5 MG/DL (ref 0.1–1.5)
BUN SERPL-MCNC: 15 MG/DL (ref 8–22)
CALCIUM SERPL-MCNC: 10 MG/DL (ref 8.5–10.5)
CHLORIDE SERPL-SCNC: 96 MMOL/L (ref 96–112)
CO2 SERPL-SCNC: 26 MMOL/L (ref 20–33)
CREAT SERPL-MCNC: 0.79 MG/DL (ref 0.5–1.4)
D DIMER PPP IA.FEU-MCNC: 0.27 UG/ML (FEU) (ref 0–0.5)
EOSINOPHIL # BLD AUTO: 0.34 K/UL (ref 0–0.51)
EOSINOPHIL NFR BLD: 2.6 % (ref 0–6.9)
ERYTHROCYTE [DISTWIDTH] IN BLOOD BY AUTOMATED COUNT: 42.5 FL (ref 35.9–50)
FLUAV+FLUBV AG SPEC QL IA: NEGATIVE
GLOBULIN SER CALC-MCNC: 3.2 G/DL (ref 1.9–3.5)
GLUCOSE BLD-MCNC: 105 MG/DL (ref 70–100)
GLUCOSE SERPL-MCNC: 109 MG/DL (ref 65–99)
HCT VFR BLD AUTO: 44.6 % (ref 42–52)
HGB BLD-MCNC: 14.5 G/DL (ref 14–18)
IMM GRANULOCYTES # BLD AUTO: 0.15 K/UL (ref 0–0.11)
IMM GRANULOCYTES NFR BLD AUTO: 1.1 % (ref 0–0.9)
INT CON NEG: NORMAL
INT CON POS: NORMAL
LYMPHOCYTES # BLD AUTO: 3.73 K/UL (ref 1–4.8)
LYMPHOCYTES NFR BLD: 28.5 % (ref 22–41)
MCH RBC QN AUTO: 28.3 PG (ref 27–33)
MCHC RBC AUTO-ENTMCNC: 32.5 G/DL (ref 33.7–35.3)
MCV RBC AUTO: 86.9 FL (ref 81.4–97.8)
MONOCYTES # BLD AUTO: 1.05 K/UL (ref 0–0.85)
MONOCYTES NFR BLD AUTO: 8 % (ref 0–13.4)
NEUTROPHILS # BLD AUTO: 7.71 K/UL (ref 1.82–7.42)
NEUTROPHILS NFR BLD: 59 % (ref 44–72)
NRBC # BLD AUTO: 0 K/UL
NRBC BLD-RTO: 0 /100 WBC
PLATELET # BLD AUTO: 359 K/UL (ref 164–446)
PMV BLD AUTO: 10.2 FL (ref 9–12.9)
POTASSIUM SERPL-SCNC: 3.8 MMOL/L (ref 3.6–5.5)
PROT SERPL-MCNC: 8 G/DL (ref 6–8.2)
RBC # BLD AUTO: 5.13 M/UL (ref 4.7–6.1)
SODIUM SERPL-SCNC: 136 MMOL/L (ref 135–145)
TSH SERPL DL<=0.005 MIU/L-ACNC: 3.36 UIU/ML (ref 0.38–5.33)
WBC # BLD AUTO: 13.1 K/UL (ref 4.8–10.8)

## 2020-05-05 PROCEDURE — 71046 X-RAY EXAM CHEST 2 VIEWS: CPT | Mod: TC | Performed by: FAMILY MEDICINE

## 2020-05-05 PROCEDURE — 87804 INFLUENZA ASSAY W/OPTIC: CPT | Performed by: FAMILY MEDICINE

## 2020-05-05 PROCEDURE — 85379 FIBRIN DEGRADATION QUANT: CPT

## 2020-05-05 PROCEDURE — 99214 OFFICE O/P EST MOD 30 MIN: CPT | Performed by: FAMILY MEDICINE

## 2020-05-05 PROCEDURE — 80053 COMPREHEN METABOLIC PANEL: CPT

## 2020-05-05 PROCEDURE — 85025 COMPLETE CBC W/AUTO DIFF WBC: CPT

## 2020-05-05 PROCEDURE — 84443 ASSAY THYROID STIM HORMONE: CPT

## 2020-05-05 PROCEDURE — 82962 GLUCOSE BLOOD TEST: CPT | Performed by: FAMILY MEDICINE

## 2020-05-05 NOTE — LETTER
May 5, 2020         Patient: Silvestre Montero III   YOB: 1990   Date of Visit: 5/5/2020           To Whom it May Concern:    Silvestre Montero was seen in my clinic on 5/5/2020.     Please excuse his absence through 5/10.    If you have any questions or concerns, please don't hesitate to call.        Sincerely,           Luis Dumont M.D.  Electronically Signed

## 2020-05-06 ENCOUNTER — TELEPHONE (OUTPATIENT)
Dept: URGENT CARE | Facility: CLINIC | Age: 30
End: 2020-05-06

## 2020-05-06 NOTE — PROGRESS NOTES
"Subjective:       Chief Complaint   Patient presents with   • Headache     fatigue,  winded, body aches, lathargic, x2 weeks, TESTED NEGATIVE FOR COVID19               fatigue  This is a new problem.   He c/o constant sob and extreme fatigue and increased sleep requirements - he has been sleeping > 12 hrs/night for the past week.  States that fatigue is worse with exertion.    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   He also has had intermittent, low-grade fevers, Tmax 100.1.           He denies cough or wheezing and does not endorse \"air hunger\", but does c/o occasionally feeling \"winded\".   He has a hx of asthma as a child, mostly exertional asthma, but has not needed to use any rescue inhaler for several years.          COVID-19 testing negative x 1, per pt - had done at outside facility.     + intermittent nausea and headaches, but no emesis or abd pain.         . Pertinent negatives include no abdominal pain, chest pain,    hemoptysis, leg pain, leg swelling, neck pain, orthopnea, PND, sore throat, syncope or vomiting. exertion aggravates the symptoms. The patient has no known risk factors for DVT/PE. Pt has tried nothing for the symptoms. There is no history of CAD, chronic lung disease, COPD, DVT, a heart failure or PE.     Social History     Tobacco Use   • Smoking status: Former Smoker     Packs/day: 0.50     Types: Cigarettes   • Smokeless tobacco: Never Used   Substance Use Topics   • Alcohol use: Not Currently   • Drug use: Not on file       Past Medical History:   Diagnosis Date   • Psychiatric disorder     bipolar       Current Outpatient Medications on File Prior to Visit   Medication Sig Dispense Refill   • benzonatate (TESSALON) 200 MG capsule Take 1 Cap by mouth 3 times a day as needed for Cough. (Patient not taking: Reported on 5/5/2020) 45 Cap 0   • fluticasone (FLONASE) 50 MCG/ACT nasal spray Spray 1 Spray in nose 2 times a day. " "(Patient not taking: Reported on 5/5/2020) 1 Bottle 0     No current facility-administered medications on file prior to visit.          Review of Systems   Constitutional: + for fever, fatigye.   HENT: Negative for sore throat.    Respiratory: . Negative for cough, wheezing and hemoptysis.    Cardiovascular: Negative for chest pain, orthopnea, leg swelling, syncope and PND.   Gastrointestinal: Negative for vomiting and abdominal pain.   Genitourinary: Negative for dysuria and urgency.   Musculoskeletal: Negative for neck pain.   Neurological: Negative for headaches.   All other systems reviewed and are negative.         Objective:     /80   Pulse 86   Temp 37.2 °C (98.9 °F) (Temporal)   Resp 20   Ht 1.753 m (5' 9\")   Wt 103 kg (227 lb)   SpO2 95%     Physical Exam   Constitutional: pt is oriented to person, place, and time.  appears well-developed and well-nourished. No distress.   HENT:   Head: Normocephalic and atraumatic.   Mouth/Throat: No oropharyngeal exudate.   Eyes: Conjunctivae and EOM are normal. Pupils are equal, round, and reactive to light.   Neck: Neck supple. No JVD present.   Cardiovascular: Normal rate, regular rhythm and normal heart sounds.  Exam reveals no friction rub.    No murmur heard.  Pulmonary/Chest: Effort normal and breath sounds normal. No respiratory distress. Pt has no rales.   Musculoskeletal: pt exhibits no edema or tenderness.   Lymphadenopathy:     She has no cervical adenopathy.   Neurological: pt is alert and oriented to person, place, and time. No cranial nerve deficit.   Skin: Skin is warm. She is not diaphoretic. No erythema.   Psychiatric: Her behavior is normal.   Nursing note and vitals reviewed.         DX-CHEST-2 VIEWS   Order: [id number]   Status:  Final result   Visible to patient:  No (Not Released) Next appt:  None Dx:  SOB (shortness of breath)   Details     Reading Physician Reading Date Result Priority   Gio Guzman M.D.  [phone] 5/5/2020 " Urgent Care      Narrative & Impression        5/5/2020 7:03 PM     HISTORY/REASON FOR EXAM:  COUGH; Shortness of Breath        TECHNIQUE/EXAM DESCRIPTION AND NUMBER OF VIEWS:  Two views of the chest.     COMPARISON:  8/11/2012.     FINDINGS:     No pulmonary infiltrates or consolidations are noted.  No pleural effusions, no pneumothorax are appreciated.  Normal cardiopericardial silhouette.     IMPRESSION:        1. No active cardiopulmonary abnormalities are identified.                Assessment/Plan:               Fatigue, unspecified type       Chest x-ray was personally interpreted and reviewed. No acute cardiopulmonary findings. No pulmonary infiltrates or densities. Cardiac silhouette is normal. No hemidiaphragm elevation. No bony abnormalities.    Influenza assay was negative.   Random glucose was normal.        COVID-19 testing negative x 1.     D-dimer was negative, which rules out PE        Will refer to primary care for further evaluation of the fatigue.         - CBC WITH DIFFERENTIAL; Future  - Comp Metabolic Panel; Future  - TSH; Future  - POCT Influenza A/B  - POCT Glucose     - DX-CHEST-2 VIEWS; Future  - D-DIMER; Future  - POCT Influenza A/B  - POCT Glucose

## 2020-05-08 ENCOUNTER — TELEPHONE (OUTPATIENT)
Dept: URGENT CARE | Facility: CLINIC | Age: 30
End: 2020-05-08

## 2020-05-08 ENCOUNTER — APPOINTMENT (OUTPATIENT)
Dept: LAB | Facility: MEDICAL CENTER | Age: 30
End: 2020-05-08
Payer: OTHER MISCELLANEOUS

## 2020-05-08 DIAGNOSIS — R53.83 OTHER FATIGUE: ICD-10-CM

## 2020-05-08 NOTE — PROGRESS NOTES
Spoke with pt and clairified the treatment:      -WBC elevated, but no obvious source of infection.   Will repeat CBC    -asthma is controlled.   No need for pulmonology referral at this time.     - will refer to PCP for further evaluation of the chronic fatigue.

## 2020-05-08 NOTE — TELEPHONE ENCOUNTER
Patient came to [facility] upset since he said you stated he is taking antibiotics when he has not been prescribed anything. He is upset so he would like you to send that medication or would like to clarify this problem please give him a call ASAP. Thank you for your cooperation.

## 2020-05-14 DIAGNOSIS — R53.83 OTHER FATIGUE: ICD-10-CM

## 2020-05-20 ENCOUNTER — TELEPHONE (OUTPATIENT)
Dept: URGENT CARE | Facility: PHYSICIAN GROUP | Age: 30
End: 2020-05-20

## 2021-01-30 ENCOUNTER — APPOINTMENT (OUTPATIENT)
Dept: RADIOLOGY | Facility: MEDICAL CENTER | Age: 31
End: 2021-01-30
Attending: EMERGENCY MEDICINE
Payer: MEDICAID

## 2021-01-30 ENCOUNTER — HOSPITAL ENCOUNTER (EMERGENCY)
Facility: MEDICAL CENTER | Age: 31
End: 2021-01-30
Attending: EMERGENCY MEDICINE
Payer: MEDICAID

## 2021-01-30 VITALS
HEART RATE: 88 BPM | DIASTOLIC BLOOD PRESSURE: 78 MMHG | HEIGHT: 69 IN | TEMPERATURE: 99.4 F | BODY MASS INDEX: 35.36 KG/M2 | SYSTOLIC BLOOD PRESSURE: 114 MMHG | OXYGEN SATURATION: 98 % | WEIGHT: 238.76 LBS | RESPIRATION RATE: 18 BRPM

## 2021-01-30 DIAGNOSIS — S63.502A SPRAIN OF LEFT WRIST, INITIAL ENCOUNTER: ICD-10-CM

## 2021-01-30 DIAGNOSIS — S83.92XA SPRAIN OF LEFT KNEE, UNSPECIFIED LIGAMENT, INITIAL ENCOUNTER: ICD-10-CM
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
## 2021-01-30 PROCEDURE — 73562 X-RAY EXAM OF KNEE 3: CPT | Mod: LT

## 2021-01-30 PROCEDURE — 73110 X-RAY EXAM OF WRIST: CPT | Mod: LT

## 2021-01-30 PROCEDURE — 99283 EMERGENCY DEPT VISIT LOW MDM: CPT | Mod: EDC

## 2021-01-30 ASSESSMENT — FIBROSIS 4 INDEX: FIB4 SCORE: 0.41

## 2021-01-30 NOTE — ED NOTES
"First interaction with patient.  Assumed care at this time.  Patient presents to the ER today for complaint of left wrist and lateral left knee pain s/p ground level fall.  He states that he fell on ice just prior to arrival.  He reports history of \"torn ligaments to my [left medial] knee, so I just want to make sure that I didn't damage it more.\"  No swelling, bruising, or deformity noted to left wrist or knee.  Gown provided, patient instructed to changed prior to ERP evaluation.  NPO status explained by this RN.  Call light provided.  Chart up for ERP.    This RN provided education to patient about the importance of keeping mask in place over both mouth and nose for entire duration of ER visit.  "

## 2021-01-30 NOTE — ED TRIAGE NOTES
..  Chief Complaint   Patient presents with   • Wrist Pain     c/o left knee and left wrist pain after a fall.    • Knee Pain       .  Vitals:    01/30/21 1350   BP: 127/83   Pulse: 90   Resp: 16   Temp: 36.9 °C (98.5 °F)   SpO2: 98%         Explained triage process, to waiting room. Asked to inform RN if questions or concerns arise.

## 2021-01-30 NOTE — ED PROVIDER NOTES
ED Provider  Scribed for Trip Pereira D.O. by Mahesh Reese. 1/30/2021  2:38 PM    Means of arrival: walk-in  History obtained from: patient  History limited by: none    CHIEF COMPLAINT  Chief Complaint   Patient presents with   • Wrist Pain     c/o left knee and left wrist pain after a fall.    • Knee Pain     PPE Note: I personally donned full PPE for all patient encounters during this visit, including wearing a mask, gloves, and eye protection. Scribe remained outside the patient's room and did not have any contact with the patient for the duration of patient encounter.       HPI  Silvestre Montero III is a left-hand dominant 30 y.o. male who presents with complaints of left wrist and left knee pain acute shortly prior to arrival after slipping and falling on ice. He states that he landed on the outside of his left knee and his left wrist with his hand extended backwards. He has mild-moderate pain along the lateral aspect of his left knee. His pain is worsened with extension of his knee. His wrist pain is worsened with extension of his fingers, but he states he is able to move them. He reports having a history of partial ligamentous tears of both of his knees in the past that did not require surgical intervention.    REVIEW OF SYSTEMS  See HPI for further details.     PAST MEDICAL HISTORY   has a past medical history of Asthma and Psychiatric disorder.    SOCIAL HISTORY  Social History     Tobacco Use   • Smoking status: Former Smoker     Packs/day: 0.50     Types: Cigarettes   • Smokeless tobacco: Never Used   Substance and Sexual Activity   • Alcohol use: Not Currently   • Drug use: Not Currently       SURGICAL HISTORY  patient denies any surgical history    CURRENT MEDICATIONS  Home Medications     Reviewed by Fatou Domínguez R.N. (Registered Nurse) on 01/30/21 at 1356  Med List Status: Not Addressed   Medication Last Dose Status   benzonatate (TESSALON) 200 MG capsule  Active   fluticasone (FLONASE) 50  "MCG/ACT nasal spray  Active                ALLERGIES  No Known Allergies    PHYSICAL EXAM  VITAL SIGNS: /83   Pulse 90   Temp 36.9 °C (98.5 °F) (Temporal)   Resp 16   Ht 1.753 m (5' 9\")   Wt 108 kg (238 lb 12.1 oz)   SpO2 98%   BMI 35.26 kg/m²   Constitutional: Alert in no apparent distress.  HENT: Normocephalic, Atraumatic, mucous membranes are moist.   Eyes: Conjunctiva normal, non-icteric.   Heart: No peripheral cyanosis   Lungs: Respirations are even and unlabored   Skin: Warm, Dry, normal color.   Extremities: Left wrist has no deformity, swelling, or discoloration. Range of motion is mildly limited by pain. Distal neurovsacular is normal. Range of motion of fingers is normal. Left knee has no swelling, deformity, or discoloration. Range of motion intact. Knee is stable in all directions. Distal neurovascular intact.   Neurologic: Alert, Grossly non-focal.   Psychiatric: Affect normal, Judgment normal, Mood normal, Appears appropriate and not intoxicated.     DIAGNOSTIC STUDIES / PROCEDURES    RADIOLOGY  DX-WRIST-COMPLETE 3+ LEFT   Final Result      No acute osseous abnormality.      DX-KNEE 3 VIEWS LEFT   Final Result      No fracture or dislocation of LEFT knee.        The radiologist's interpretations of all radiological studies have been reviewed by me.    Films have been independently by me      COURSE  Pertinent Labs & Imaging studies reviewed. (See chart for details)    2:38 PM - Patient seen and examined at bedside. Discussed plan of care, including xray imaging. Patient agrees to the plan of care. Ordered for left knee and left wrist xray to evaluate his symptoms.     3:23 PM - Patient was reevaluated at bedside. Patient is doing well currently. Discussed radiology results with the patient which did not show any acute abnormalities or fractures. I informed them of the plan for discharge. Patient was provided with wrist splint for comfort.  Advised follow-up with a PCP for further " evaluation and treatment. Advised Tylenol/ibuprofen and ice for pain control. ED return precautions were discussed. Patient verbalizes understanding and agreement to this plan of care.      MEDICAL DECISION MAKING  This is a 30 y.o. male who presents after ground-level fall, slipped on the ice.  Complains of pain in the wrist and knee.  The exam of this area shows some mild tenderness, no obvious signs of fracture but x-rays were done x-ray shows no fracture or dislocation.  He is stable for symptomatic treatment    The patient will return for new or worsening symptoms and is stable at the time of discharge. The patient is referred to a primary physician for blood pressure management, diabetic screening, and for all other preventative health concerns.    DISPOSITION:  Patient will be discharged home in stable condition.    FOLLOW UP:  [facility] scheduling  Please call [phone] to make an appoint with a next available practitioner  In 1 week      FINAL IMPRESSION  1. Sprain of left wrist, initial encounter    2. Sprain of left knee, unspecified ligament, initial encounter    3. Fall, initial encounter         Mahesh LAYNE (Rahulibe), am scribing for, and in the presence of, Trip Pereira D.O..    Electronically signed by: Mahesh Reese (Rahulibe), 1/30/2021    ITrip D.O. personally performed the services described in this documentation, as scribed by Mahesh Reese in my presence, and it is both accurate and complete. E    The note accurately reflects work and decisions made by me.  Trip Pereira D.O.  1/30/2021  8:01 PM

## 2021-01-30 NOTE — DISCHARGE INSTRUCTIONS
Weightbearing as tolerated, use wrist as tolerated.  Use Motrin Tylenol for discomfort, use ice packs for the next 24 to 48 hours to assist with discomfort.

## 2022-05-13 ENCOUNTER — OFFICE VISIT (OUTPATIENT)
Dept: URGENT CARE | Facility: CLINIC | Age: 32
End: 2022-05-13
Payer: COMMERCIAL

## 2022-05-13 VITALS
TEMPERATURE: 98.3 F | WEIGHT: 236 LBS | DIASTOLIC BLOOD PRESSURE: 88 MMHG | SYSTOLIC BLOOD PRESSURE: 120 MMHG | BODY MASS INDEX: 35.77 KG/M2 | OXYGEN SATURATION: 98 % | HEART RATE: 83 BPM | HEIGHT: 68 IN | RESPIRATION RATE: 24 BRPM

## 2022-05-13 DIAGNOSIS — M25.552 LEFT HIP PAIN: ICD-10-CM

## 2022-05-13 DIAGNOSIS — S76.012A HIP STRAIN, LEFT, INITIAL ENCOUNTER: ICD-10-CM

## 2022-05-13 DIAGNOSIS — R53.83 OTHER FATIGUE: ICD-10-CM

## 2022-05-13 DIAGNOSIS — M62.838 MUSCLE SPASM: ICD-10-CM

## 2022-05-13 PROCEDURE — 99214 OFFICE O/P EST MOD 30 MIN: CPT

## 2022-05-13 RX ORDER — CYCLOBENZAPRINE HCL 5 MG
5-10 TABLET ORAL 2 TIMES DAILY PRN
Qty: 14 TABLET | Refills: 0 | Status: SHIPPED | OUTPATIENT
Start: 2022-05-13 | End: 2022-05-20

## 2022-05-13 RX ORDER — DICLOFENAC SODIUM 75 MG/1
75 TABLET, DELAYED RELEASE ORAL 2 TIMES DAILY
Qty: 14 TABLET | Refills: 0 | Status: SHIPPED | OUTPATIENT
Start: 2022-05-13 | End: 2022-05-20

## 2022-05-13 RX ORDER — KETOROLAC TROMETHAMINE 30 MG/ML
30 INJECTION, SOLUTION INTRAMUSCULAR; INTRAVENOUS ONCE
Status: COMPLETED | OUTPATIENT
Start: 2022-05-13 | End: 2022-05-13

## 2022-05-13 RX ADMIN — KETOROLAC TROMETHAMINE 30 MG: 30 INJECTION, SOLUTION INTRAMUSCULAR; INTRAVENOUS at 16:31

## 2022-05-13 ASSESSMENT — ENCOUNTER SYMPTOMS
EYES NEGATIVE: 1
CARDIOVASCULAR NEGATIVE: 1
RESPIRATORY NEGATIVE: 1
GASTROINTESTINAL NEGATIVE: 1

## 2022-05-13 NOTE — LETTER
"[facility]  [address] [address]     May 13, 2022    Patient: Silvestre Montero III   YOB: 1990   Date of Visit: 5/13/2022       To Whom It May Concern:    Silvestre Montero was seen and treated in our department on 5/13/2022.     Sincerely,     Fatou Patten \"Terrell\" LAURA Gipson                 "

## 2022-05-13 NOTE — PROGRESS NOTES
"Subjective     TETO Montero III is a 31 y.o. male who presents with Hip Pain (L hip pain x 4 days)        HPI    Patient presents today with left hip pain for 4 days now. He reports pain is sharp, radiating to the left leg, 6-8/10 with movement, aggravated by movement and lying on his side, relieved by rest and lying supine. He took some acetaminophen with no relief.He reports some weakness on the left leg. He denies any numbness or tingling on the left leg.Two days prior, he reports pain is worse difficulty with getting up from the toilet seat.  He denies any history of injury on the said hip. He denies any recent mechanism of injury on the said hip.     Patient's current problem list, medications, and past medical/surgical history were reviewed in Epic.    PMH:  has a past medical history of Asthma and Psychiatric disorder.  MEDS: No current outpatient medications on file.  ALLERGIES: No Known Allergies  SURGHX: No past surgical history on file.  SOCHX:  reports that he has quit smoking. His smoking use included cigarettes. He smoked 0.50 packs per day. He has never used smokeless tobacco. He reports previous alcohol use. He reports previous drug use.  FH: Reviewed with patient, not pertinent to this visit.     Review of Systems   HENT: Negative.    Eyes: Negative.    Respiratory: Negative.    Cardiovascular: Negative.    Gastrointestinal: Negative.    Genitourinary: Negative.    Musculoskeletal: Positive for joint pain.        Left hip pain              Objective     /88 (BP Location: Right arm, Patient Position: Sitting, BP Cuff Size: Large adult long)   Pulse 83   Temp 36.8 °C (98.3 °F) (Temporal)   Resp (!) 24   Ht 1.727 m (5' 8\")   Wt 107 kg (236 lb)   SpO2 98%   BMI 35.88 kg/m²      Physical Exam  Constitutional:       Appearance: Normal appearance.   HENT:      Head: Normocephalic.      Nose: Nose normal.   Eyes:      Extraocular Movements: Extraocular movements intact.   Cardiovascular:      " Rate and Rhythm: Normal rate and regular rhythm.      Pulses: Normal pulses.      Heart sounds: Normal heart sounds.   Pulmonary:      Effort: Pulmonary effort is normal.      Breath sounds: Normal breath sounds.   Musculoskeletal:         General: Normal range of motion.      Cervical back: Normal range of motion.   Skin:     General: Skin is warm.   Neurological:      General: No focal deficit present.      Mental Status: He is alert.   Psychiatric:         Mood and Affect: Mood normal.         Behavior: Behavior normal.                   Assessment & Plan      1. Hip strain, left, initial encounter      2. Left hip pain    - ketorolac (TORADOL) injection 30 mg  - diclofenac DR (VOLTAREN) 75 MG Tablet Delayed Response; Take 1 Tablet by mouth in the morning and 1 Tablet in the evening. Do all this for 7 days.  Dispense: 14 Tablet; Refill: 0    3. Muscle spasm    - cyclobenzaprine (FLEXERIL) 5 mg tablet; Take 1-2 Tablets by mouth 2 times a day as needed for Muscle Spasms for up to 7 days.  Dispense: 14 Tablet; Refill: 0      Patient's presentation is consistent with a left hip strain and muscle spasm. Discussed treatment plan with patient and mother, agreeable and verbalized understanding.  Patient is given Toradol IM in the clinic.  Recommended RICE-rest, ice or heat, elevation.  May take Voltaren twice daily for additional pain relief.  May take Flexeril twice daily as needed for muscle spasm.  Patient is educated on medication side effects, recommended taking Voltaren with food.  Patient is educated on signs and symptoms to watch out for, when to return to the clinic or go to the ER.    Differential diagnoses, supportive care, and indications for immediate follow-up discussed with patient. Pathogenesis of diagnosis discussed including typical length and natural progression.     Instructed to return to clinic or nearest emergency department for any change in condition, further concerns, or worsening of  symptoms.    Work note provided.    Electronically Signed by LAURA Gutierrez

## 2022-07-01 ENCOUNTER — OFFICE VISIT (OUTPATIENT)
Dept: URGENT CARE | Facility: CLINIC | Age: 32
End: 2022-07-01
Payer: COMMERCIAL

## 2022-07-01 VITALS
SYSTOLIC BLOOD PRESSURE: 122 MMHG | OXYGEN SATURATION: 99 % | HEART RATE: 100 BPM | TEMPERATURE: 99 F | RESPIRATION RATE: 20 BRPM | DIASTOLIC BLOOD PRESSURE: 84 MMHG

## 2022-07-01 DIAGNOSIS — U07.1 COVID-19 VIRUS INFECTION: ICD-10-CM

## 2022-07-01 DIAGNOSIS — J45.20 MILD INTERMITTENT ASTHMA WITHOUT COMPLICATION: ICD-10-CM

## 2022-07-01 DIAGNOSIS — R68.89 FLU-LIKE SYMPTOMS: ICD-10-CM

## 2022-07-01 LAB
EXTERNAL QUALITY CONTROL: ABNORMAL
SARS-COV+SARS-COV-2 AG RESP QL IA.RAPID: POSITIVE

## 2022-07-01 PROCEDURE — 87426 SARSCOV CORONAVIRUS AG IA: CPT | Performed by: FAMILY MEDICINE

## 2022-07-01 PROCEDURE — 99213 OFFICE O/P EST LOW 20 MIN: CPT | Mod: CS | Performed by: FAMILY MEDICINE

## 2022-07-01 RX ORDER — ALBUTEROL SULFATE 90 UG/1
2 AEROSOL, METERED RESPIRATORY (INHALATION) EVERY 6 HOURS PRN
Qty: 8.5 G | Refills: 0 | Status: SHIPPED | OUTPATIENT
Start: 2022-07-01

## 2022-07-01 ASSESSMENT — ENCOUNTER SYMPTOMS
MYALGIAS: 1
COUGH: 1
CHILLS: 1

## 2022-07-01 NOTE — LETTER
July 1, 2022    To Whom It May Concern:         This is confirmation that Silvestre Montero III attended his scheduled appointment with Nazario Glover M.D. on 7/01/22.  He has COVID-19.  He is on mandatory home quarantine until 7/4/22.  He may return to work 7/5/22 if feeling much better, however he is still mildly contagious until 7/9/22.  Thank you for making accommodations as he recovers.          If you have any questions please do not hesitate to call me at the phone number listed below.    Sincerely,          Nazario Glover M.D.  [phone]

## 2022-07-01 NOTE — PROGRESS NOTES
Subjective:     Silvestre Montero III is a 31 y.o. male who presents for Cough (X 3 days, Cough, chest congestion, runny nose, body ache, headache)    HPI  Pt presents for evaluation of an acute problem  Patient with cough for the past 3 days  Also having nasal congestion/rhinorrhea, headaches, and body aches  Started 2 nights ago   Having decreased appetite   Very fatigued   No N/V/D     Review of Systems   Constitutional: Positive for chills and malaise/fatigue.   HENT: Positive for congestion.    Respiratory: Positive for cough.    Musculoskeletal: Positive for myalgias.     PMH:  has a past medical history of Asthma and Psychiatric disorder.  MEDS:   Current Outpatient Medications:   •  albuterol 108 (90 Base) MCG/ACT Aero Soln inhalation aerosol, Inhale 2 Puffs every 6 hours as needed for Shortness of Breath., Disp: 8.5 g, Rfl: 0  ALLERGIES: No Known Allergies  SURGHX: History reviewed. No pertinent surgical history.  SOCHX:  reports that he has quit smoking. His smoking use included cigarettes. He smoked 0.50 packs per day. He has never used smokeless tobacco. He reports previous alcohol use. He reports previous drug use.     Objective:   /84 (BP Location: Left arm, Patient Position: Sitting, BP Cuff Size: Large adult)   Pulse 100   Temp 37.2 °C (99 °F) (Temporal)   Resp 20   SpO2 99%     Physical Exam  Constitutional:       General: He is not in acute distress.     Appearance: He is well-developed. He is not diaphoretic.   HENT:      Head: Normocephalic and atraumatic.      Right Ear: Tympanic membrane, ear canal and external ear normal.      Left Ear: Tympanic membrane, ear canal and external ear normal.      Nose: Congestion present.      Mouth/Throat:      Mouth: Mucous membranes are moist.      Pharynx: Oropharynx is clear. No oropharyngeal exudate or posterior oropharyngeal erythema.   Neck:      Trachea: No tracheal deviation.   Cardiovascular:      Rate and Rhythm: Normal rate and regular  rhythm.      Heart sounds: Normal heart sounds.   Pulmonary:      Effort: Pulmonary effort is normal. No respiratory distress.      Breath sounds: Normal breath sounds. No wheezing or rales.   Musculoskeletal:         General: Normal range of motion.      Cervical back: Normal range of motion and neck supple. No tenderness.   Lymphadenopathy:      Cervical: No cervical adenopathy.   Skin:     General: Skin is warm and dry.      Findings: No rash.   Neurological:      Mental Status: He is alert.   Psychiatric:         Behavior: Behavior normal.         Thought Content: Thought content normal.         Judgment: Judgment normal.         Assessment/Plan:   Assessment    1. COVID-19 virus infection  - albuterol 108 (90 Base) MCG/ACT Aero Soln inhalation aerosol; Inhale 2 Puffs every 6 hours as needed for Shortness of Breath.  Dispense: 8.5 g; Refill: 0    2. Flu-like symptoms    3. Mild intermittent asthma without complication    Patient with COVID-19 infection.  Has history of asthma which is not currently exacerbated.  Reviewed supportive care measures and expected course of recovery.  Refilled his albuterol inhaler.  Given follow-up precautions and will follow-up as needed.

## 2023-01-06 ENCOUNTER — HOSPITAL ENCOUNTER (EMERGENCY)
Facility: MEDICAL CENTER | Age: 33
End: 2023-01-06
Attending: EMERGENCY MEDICINE
Payer: COMMERCIAL

## 2023-01-06 ENCOUNTER — APPOINTMENT (OUTPATIENT)
Dept: RADIOLOGY | Facility: MEDICAL CENTER | Age: 33
End: 2023-01-06
Attending: EMERGENCY MEDICINE
Payer: COMMERCIAL

## 2023-01-06 VITALS
HEART RATE: 94 BPM | SYSTOLIC BLOOD PRESSURE: 136 MMHG | RESPIRATION RATE: 16 BRPM | OXYGEN SATURATION: 96 % | BODY MASS INDEX: 35.77 KG/M2 | WEIGHT: 236 LBS | HEIGHT: 68 IN | TEMPERATURE: 98.5 F | DIASTOLIC BLOOD PRESSURE: 92 MMHG

## 2023-01-06 DIAGNOSIS — M54.9 PAIN, UPPER BACK: ICD-10-CM

## 2023-01-06 DIAGNOSIS — S93.492A SPRAIN OF ANTERIOR TALOFIBULAR LIGAMENT OF LEFT ANKLE, INITIAL ENCOUNTER: ICD-10-CM

## 2023-01-06 DIAGNOSIS — S83.411A SPRAIN OF MEDIAL COLLATERAL LIGAMENT OF RIGHT KNEE, INITIAL ENCOUNTER: ICD-10-CM

## 2023-01-06 PROCEDURE — 73610 X-RAY EXAM OF ANKLE: CPT | Mod: LT

## 2023-01-06 PROCEDURE — 73564 X-RAY EXAM KNEE 4 OR MORE: CPT | Mod: RT

## 2023-01-06 PROCEDURE — 99284 EMERGENCY DEPT VISIT MOD MDM: CPT

## 2023-01-06 RX ORDER — IBUPROFEN 800 MG/1
800 TABLET ORAL EVERY 8 HOURS PRN
Qty: 30 TABLET | Refills: 0 | Status: SHIPPED | OUTPATIENT
Start: 2023-01-06

## 2023-01-06 NOTE — ED PROVIDER NOTES
ED Provider Note    CHIEF COMPLAINT  Chief Complaint   Patient presents with    Ankle Injury     Slipped on ice on Wednesday, did not fall. L ankle and right knee. 8/10 pain in ankle and knee. Able to bear weight but pain with ambulation  Slipped a few weeks ago and injured back and neck. Transient bilateral hand numbness since fall. Bilateral neck pain, no c-spine tenderness on palpation.        LIMITATION TO HISTORY   Select: None    HPI    Silvestre Montero III is a 32 y.o. male who presents to the Emergency Department with left lateral ankle pain and right distal anterior knee pain since slipping and twisting his knee and ankle without falling on the ice 2 days ago.  He states he cannot bear weight well on either leg.  Pain severity 8 of 10.  He states he is accident prone.  He has upper back pain from a fall 2 weeks ago.    OUTSIDE HISTORIAN(S):  Select: None    EXTERNAL RECORDS REVIEWED  Select: Other none    REVIEW OF SYSTEMS  Pertinent positives include: Right knee and left ankle pain, difficulty walking.  Prior right knee injury  Pertinent negatives include: Head, neck, back, chest or abdominal injury.      PAST MEDICAL HISTORY  Past Medical History:   Diagnosis Date    Asthma     Psychiatric disorder     bipolar       SOCIAL HISTORY  Social History     Tobacco Use    Smoking status: Former     Packs/day: 0.50     Types: Cigarettes    Smokeless tobacco: Never   Vaping Use    Vaping Use: Never used   Substance Use Topics    Alcohol use: Yes     Comment: occ    Drug use: Not Currently     Social History     Substance and Sexual Activity   Drug Use Not Currently     CURRENT MEDICATIONS  No current facility-administered medications for this encounter.    Current Outpatient Medications:     albuterol 108 (90 Base) MCG/ACT Aero Soln inhalation aerosol, Inhale 2 Puffs every 6 hours as needed for Shortness of Breath., Disp: 8.5 g, Rfl: 0    ALLERGIES  No Known Allergies    PHYSICAL EXAM  VITAL SIGNS: BP (!) 136/92    "Pulse 94   Temp 36.9 °C (98.5 °F) (Temporal)   Resp 16   Ht 1.727 m (5' 8\")   Wt 107 kg (236 lb)   SpO2 96%   BMI 35.88 kg/m²   Reviewed and high normal blood pressure, afebrile  Constitutional: Well developed, Well nourished, well-appearing.  HENT: Normocephalic, atraumatic, bilateral external ears normal  Eyes: PERRLA, conjunctiva pink, no scleral icterus.   Cardiovascular: Dorsalis pedis pulses 2+  Abdominal:    Skin: No erythema, no rash. No wounds or bruising.  Musculoskeletal: no deformities.  Lateral malleolus and talofibular ligament tenderness left ankle with mild edema.  No posterior medial malleoli or tenderness.  No foot tenderness.  Proximal leg atraumatic.  Right knee with bilateral joint line tenderness, no effusion and full active extension.  There is medial collateral ligament pain without laxity on stress testing.  There is mild laxity but no pain on anterior drawer.  Mild paraspinous tenderness mid upper back.  Neurologic: Alert & oriented x 3, cranial nerves 2-12 intact by passive exam.  Moves 4 limbs with symmetric strength.  Psychiatric: Affect normal, Judgment normal, Mood normal.     RADIOLOGY  I have independently interpreted the knee and ankle x-ray associated with this visit they appear normal.  I performed knee ligament stress testing.  I am awaiting the final reading from the radiologist which will be documented below.     Final Radiology Report  DX-ANKLE 3+ VIEWS LEFT   Final Result      No radiographic evidence of acute traumatic injury.      DX-KNEE COMPLETE 4+ RIGHT   Final Result         1.  No radiographic evidence of acute injury.        Radiologist interpretation have been reviewed by me.     ED COURSE:    INTERVENTIONS BY ME:  Hinged mini knee brace and Velcro ankle air splint    MEDICAL DECISION MAKING:  PROBLEMS EVALUATED THIS VISIT:    This patient presents with a right knee sprain likely a medial meniscusAfter a twist injury.  There is no fracture or dislocation.  " There is no effusion.  Meniscus injury is possible.  He has a slightly lax ACL but this is probably chronic    Patient also has a left ankle sprain without evidence of fracture or dislocation    RISK:  Low       PLAN:  Discharge Medication List as of 1/6/2023 12:31 PM        START taking these medications    Details   ibuprofen (MOTRIN) 800 MG Tab Take 1 Tablet by mouth every 8 hours as needed for Mild Pain., Disp-30 Tablet, R-0, Normal         Knee and ankle sprain handout given    Followup:  Luis Brand M.D.  [address]  [address]  [facility] [address]  [phone]    Schedule an appointment as soon as possible for a visit   As needed if not improving one week      CONDITION: Stable.     FINAL IMPRESSION  1. Sprain of anterior talofibular ligament of left ankle, initial encounter    2. Sprain of medial collateral ligament of right knee, initial encounter    3. Pain, upper back         Electronically signed by: Ayush Pabon M.D., 1/6/2023 11:10 AM

## 2023-01-06 NOTE — ED TRIAGE NOTES
Silvestre Aguirre Kylah III  32 y.o. male  Chief Complaint   Patient presents with    Ankle Injury     Slipped on ice on Wednesday, did not fall. L ankle and right knee. 8/10 pain in ankle and knee. Able to bear weight but pain with ambulation  Slipped a few weeks ago and injured back and neck. Transient bilateral hand numbness since fall. Bilateral neck pain, no c-spine tenderness on palpation.      Pt ambulatory to triage with limping gait for above complaint. Placed in wc in triage.    Pt is GCS 15, speaking in full sentences, follows commands and responds appropriately to questions. Resp are even and unlabored. Patient denies pain.     Pt placed in lobby. Pt educated on triage process. Pt encouraged to alert staff for any changes.     This RN masked and in appropriate PPE during encounter.     Vitals:    01/06/23 1036   BP: (!) 136/92   Pulse: 94   Resp: 16   Temp: 36.9 °C (98.5 °F)   SpO2: 96%

## 2023-01-06 NOTE — ED NOTES
Patient given discharge instructions and education. Verbalizes understanding. Given chance to ask questions. Patient educated on signs and symptoms to returned to ER, Educated patient they can return for any concerning symptoms. One prescription sent to pharmacy on file. Education given to patient about medication. Patient states they have their belongings. Denies additional needs at this time. Reports pain is well controlled. Patient monitored every hour and PRN for safety and comfort. Patient out of deparment via wheelchair. Patient amb from gurney to wheelchair with steady gait

## 2023-01-06 NOTE — DISCHARGE INSTRUCTIONS
Ankle Sprain  You have a knee and an ankle sprain.  Wear splint and brace.  Ice the ankle 15 minutes at a time 4-6 times daily the first two days.  Elevate and rest the ankle as much as possible.  Use splint .  Take ibuprofen as needed for pain.  Followup if not much better in 7 days for repeat xrays.  Ibuprofen 800 mg    Your caregiver has diagnosed you as suffering from an ankle sprain. Ankle sprain occurs when the ligaments that hold the ankle joint together are stretched or torn. It may take 4 to 6 weeks to heal.    HOME CARE INSTRUCTIONS  Apply ice to the sore area for 15 to 20 minutes four times per day while awake for the first 2 days. Put the ice in a plastic bag and place a towel between the bag of ice and your skin.  After 2 days, you may apply heat to the injury to help relieve pain. You may use a warm heating pad for 15 to 20 minutes four times per day while awake for 2 days or as needed. Do not sleep with a heating pad. If you are diabetic, do not use a heating pad unless instructed to do so.  Keep your leg elevated when possible to lessen swelling.  For Activity: Use crutches with non-weight bearing for 1 week. Then, you may walk on your ankle as the pain allows, or as instructed. Start gradually with weight bearing on the affected ankle.  Continue to use crutches or cane until you can stand on your ankle without causing pain.  If a plaster splint was applied, wear the splint until you are seen for a follow-up examination. Rest it on nothing harder than a pillow the first 24 hours. Do not put weight on it. Do not get it wet. You may take it off to take a shower or bath.   If an air splint was applied, you may blow more air in it or take some out to make it more comfortable. You may take it off at night and to take a shower or bath.  Wiggle your toes in the splint several times per day if you are able.  You may have been given an elastic bandage to use with the plaster splint or alone. The splint is too  tight if you have numbness, tingling, or if your foot becomes cold and blue. Adjust the bandage to make it comfortable.  Take medications as directed by your caregiver. Use acetaminophen (Tylenol®)or ibuprofen (Advil® or Motrin®) as needed for pain.    CALL IF:  You have an increase in bruising, swelling or pain.  You notice coldness of your toes.  Pain relief is not obtained with medications.    seek immediate medical attention if: your toes are numb or blue or you have severe pain.      [employer]® Patient Information ©2007 [employer].

## 2024-06-15 ENCOUNTER — HOSPITAL ENCOUNTER (EMERGENCY)
Facility: MEDICAL CENTER | Age: 34
End: 2024-06-15
Attending: EMERGENCY MEDICINE
Payer: COMMERCIAL

## 2024-06-15 VITALS
HEART RATE: 80 BPM | SYSTOLIC BLOOD PRESSURE: 131 MMHG | HEIGHT: 69 IN | OXYGEN SATURATION: 96 % | TEMPERATURE: 98 F | WEIGHT: 259.48 LBS | BODY MASS INDEX: 38.43 KG/M2 | RESPIRATION RATE: 16 BRPM | DIASTOLIC BLOOD PRESSURE: 70 MMHG

## 2024-06-15 DIAGNOSIS — S39.012A STRAIN OF LUMBAR REGION, INITIAL ENCOUNTER: ICD-10-CM

## 2024-06-15 PROCEDURE — 99282 EMERGENCY DEPT VISIT SF MDM: CPT

## 2024-06-15 RX ORDER — METHOCARBAMOL 750 MG/1
750 TABLET, FILM COATED ORAL 4 TIMES DAILY
Qty: 45 TABLET | Refills: 0 | Status: SHIPPED | OUTPATIENT
Start: 2024-06-15

## 2024-06-15 RX ORDER — NAPROXEN 500 MG/1
500 TABLET ORAL 2 TIMES DAILY WITH MEALS
Qty: 30 TABLET | Refills: 0 | Status: SHIPPED | OUTPATIENT
Start: 2024-06-15

## 2024-06-15 ASSESSMENT — PAIN DESCRIPTION - PAIN TYPE: TYPE: ACUTE PAIN

## 2024-06-15 NOTE — ED PROVIDER NOTES
"ED Provider Note    Primary care provider: Pcp Pt States None    CHIEF COMPLAINT  Chief Complaint   Patient presents with    Low Back Pain     Lower left back tenderness after lifting heavy object on 5/12, denies dysuria, urinary frequency       HPI  Silvestre Montero III is a 33 y.o. male who presents to the Emergency Department for moderate left-sided low back pain.  The patient states that he feels a bulge in this region and that the tissue is mobile, he feels that his symptoms are most consistent with a herniated disc.  He denies any numbness or focal weakness, denies any radiation of the pain.  Pain has been present for the past few days, was preceded by lifting a heavy box.  Does not work currently.  Does not take any medications.  States he does have chronic pain throughout his body and multiple somatic symptoms that are ongoing, no worse than usual.  States that due to his severe chronic pain he does not really appreciate pain like a normal individual would and feels that this must be quite serious and is requesting an emergent MRI.      External Record Review: Occasional ER visits, most recent outpatient visits were from the [facility] in 2023.  No recent prescriptions in the narcotic database.    REVIEW OF SYSTEMS  See HPI.     PAST MEDICAL HISTORY   has a past medical history of Asthma and Psychiatric disorder.    SURGICAL HISTORY  patient denies any surgical history    SOCIAL HISTORY  Social History     Tobacco Use    Smoking status: Former     Current packs/day: 0.50     Types: Cigarettes    Smokeless tobacco: Never   Vaping Use    Vaping status: Never Used   Substance Use Topics    Alcohol use: Yes     Comment: \"once a week\"    Drug use: Not Currently      Social History     Substance and Sexual Activity   Drug Use Not Currently       FAMILY HISTORY  History reviewed. No pertinent family history.    CURRENT MEDICATIONS  Reviewed.  See Encounter Summary.     ALLERGIES  No Known " "Allergies    PHYSICAL EXAM  VITAL SIGNS: /70   Pulse 80   Temp 36.7 °C (98 °F) (Temporal)   Resp 16   Ht 1.753 m (5' 9\")   Wt 118 kg (259 lb 7.7 oz)   SpO2 96%   BMI 38.32 kg/m²   Constitutional: Awake, alert in no apparent distress.  BMI 38.  HENT: Normocephalic, Bilateral external ears normal. Nose normal.   Eyes: Conjunctiva normal, non-icteric, EOMI.    Thorax & Lungs: Easy unlabored respirations, Clear to ascultation bilaterally.  Cardiovascular: Regular rate, Regular rhythm, No murmurs, rubs or gallops. Bilateral pulses symmetrical.   Abdomen:  Soft, nontender, nondistended, normal active bowel sounds.   :    Skin: Visualized skin is  Dry, No erythema, No rash.   Musculoskeletal:   No midline tenderness or step-off, back is normal in appearance.  Neurologic: Alert, Grossly non-focal.  Negative straight leg raise bilaterally.  The patient is hyperflexible, all joints have hypermobility.  Psychiatric: Normal affect, Normal mood  Lymphatic:        COURSE & MEDICAL DECISION MAKING  Pertinent Labs & Imaging studies reviewed. (See chart for details)    COURSE & MEDICAL DECISION MAKING  Pertinent Labs & Imaging studies reviewed. (See chart for details)    Differential diagnoses include but are not limited to: Query Twan-Danlos syndrome.    2:59 PM - Nursing notes reviewed, patient seen and examined at bedside.    Patient coming back into the room to tell me that I did not examine him properly, he tells me that he does not have a herniated disc that he actually has a hernia like a ventral hernia only in his back.  He tells me that if he flexes his muscles, he can cause the back to pop out.  I then had him attempt to strain and he did not have any bulge in the back that I could appreciate.  I explained the patient that there is not a way that he can herniate his abdomen through his back.  He is asking for a second opinion, he states he has always had back pain and this is a different type of back pain. "  I explained that he should continue on with her current plan, if he likes a second opinion he can obtain a primary care physician for 1.    Escalation of care considered, and ultimately not performed: Laboratory analysis and diagnostic imaging.    Barriers to care at this time, including but not limited to: Patient does not have established PCP.     Decision tools and prescription drugs considered including, but not limited to: NSAIDs    Decision Making:  This is a pleasant 33 y.o. year old male who presents with back pain.  The patient is neurologically intact, no concerns for acute cauda equina.  HNP is possible, I explained that it would be very premature to order any type of radiological imaging as the pain is only been present for a few days.  He then change his tact and states that his pain is active been going on longer, only worse for the past few days.  He tells me that his pain is not easily quantified due to his years of severe diffuse body pain.    He still does not require any emergent MRI, at best I can order outpatient MRI, recommend he schedule that over the next few weeks.  I explained that this is typically a presurgical planning and based on his symptomatology today he would not be any type of candidate for discectomy or fusion based on the symptoms currently.  I would recommend starting with physical therapy and escalating if no improvement after 1 month.    That being said he is extremely hypermobile and question whether he has a connective tissue disorder such as Twan-Danlos syndrome which would set him up for disc herniations, joint dislocations etc.  Physical therapy would be beneficial for this as well.  Primary care follow-up would be most beneficial.   The patient was discharged home (see d/c instructions) was told to return immediately for any signs or symptoms listed, or any worsening at all.  The patient verbally agreed to the discharge precautions and follow-up plan which is  documented in EPIC.    Discharge Medications:  New Prescriptions    METHOCARBAMOL (ROBAXIN) 750 MG TAB    Take 1 Tablet by mouth 4 times a day.    NAPROXEN (NAPROSYN) 500 MG TAB    Take 1 Tablet by mouth 2 times a day with meals.       FINAL IMPRESSION  1. Strain of lumbar region, initial encounter

## 2024-06-15 NOTE — ED NOTES
VSS. Patient alert and oriented. Patient arranged ride for self. Able to ambulate off unit safely with steady gait, mother present.

## 2024-06-15 NOTE — ED NOTES
Patient discharged home per ERP.  Discharge teaching and education discussed with patient. POC discussed.   Patient verbalized understanding of discharge teaching and education. No other questions at this time.     RX for robaxin & naproxen sent to pt's pharmacy.

## 2024-06-15 NOTE — ED TRIAGE NOTES
Chief Complaint   Patient presents with    Low Back Pain     Lower left back tenderness after lifting heavy object on 5/12, denies dysuria, urinary frequency     Pt ambulatory to triage for above complaint.      Pt is alert/oriented and follows commands. Pt speaking in full sentences and responds appropriately to questions. No acute distress noted in triage and respirations are even and unlabored.     Pt placed in lobby and educated on triage process. Pt encouraged to alert staff for any changes in condition.

## 2024-08-02 ENCOUNTER — APPOINTMENT (OUTPATIENT)
Dept: RADIOLOGY | Facility: MEDICAL CENTER | Age: 34
End: 2024-08-02
Attending: EMERGENCY MEDICINE
Payer: COMMERCIAL